# Patient Record
(demographics unavailable — no encounter records)

---

## 2024-10-16 NOTE — DISCUSSION/SUMMARY
[FreeTextEntry1] : Bronchitis- Antibiotics as prescribed. Drink plenty of fluids. Discussed use/ avoidance of OTC cough meds. Reviewed reasons to seek immediate care and follow up if condition worsens or does not improve.  Return for follow up in 1 week.   Gave HO rama Verduzco on pathophysiology and therapy of keratosis Discussed etiology with patient/parent and chronic nature of condition as well as heredity Can try lachydrin and Buff Puff but often treatment not completely satisfactory Observation only unless patient bothered Recheck PE/PRN

## 2024-10-16 NOTE — PHYSICAL EXAM
[Wheezing] : no wheezing [Rhonchi] : rhonchi [NL] : warm, clear [FreeTextEntry7] : b/l rhonchi, no distress

## 2024-10-16 NOTE — HISTORY OF PRESENT ILLNESS
[de-identified] : cough x 1 week [FreeTextEntry6] : c/o cough x 1 week, getting worse, sent home from school today, no fever eating ok  also c/o rash on face and arms

## 2024-11-26 NOTE — HISTORY OF PRESENT ILLNESS
[de-identified] : COUGH,CONGESTED [FreeTextEntry6] : cough, congested on/off for last 3-4 weeks. no fevers. eating/dirnking well

## 2024-11-26 NOTE — DISCUSSION/SUMMARY
[FreeTextEntry1] : AOM Complete antibiotic course. Provide ibuprofen as needed for pain or fever. If no improvement within 48 hours return for re-evaluation. Follow up in 2 weeks.  Albuterol Neb therapy in office:2.5mg After neb:upon repeat exmaination, significant air entry improvement Discussed triggers/using albuterol as rescue medicine For flare:  albuterol 2-3x per day for 5 days Call if no better 2-3 days, sooner for change/worsening/concerns. recheck in office prn Discussed signs/symptoms that would require immediate care.  father expressed understanding.

## 2024-12-10 NOTE — HISTORY OF PRESENT ILLNESS
[de-identified] : fever x 3 days, cough [FreeTextEntry6] : c/o fever x 3 days, low grade , reduced with tylenol / motrin cough , runny nose  drinking ok, normal UOP   had OM 2 weeks ago , symptoms had  resolved after treatment  had bronchitis 3 weeks ago, symptoms resolved after treatment

## 2024-12-10 NOTE — DISCUSSION/SUMMARY
[FreeTextEntry1] : URI - Symptomatic therapy as needed including acetaminophen or ibuprofen for fever. Increase fluids Avoid airway irritants Discussed use/avoidance of cold symptom medications Call if no better 3-5 days, sooner for change/concerns/wheeze/distress recheck prn RVP sent - due to frequent illness, phone f/u with results   Fever in office - mom prefers to give antipyretic at home . fever management discussed   At risk for dehydration  Reviewed importance of encouraging fluids, monitor UOP if not tolerating fluids / decreased UOP [place] [facility] [place]  Improved.

## 2024-12-18 NOTE — DISCUSSION/SUMMARY
[FreeTextEntry1] : Spent 30 minutes in room- reviewed full history Nebulizer given- increased aeration after treatment, crackles RLL and wheezing remain Continue nebulizer q6 Viral swab sent meds ordered Motrin q6 Increase fluids r/c 5 days

## 2024-12-18 NOTE — REVIEW OF SYSTEMS
[Fever] : fever [Ear Pain] : ear pain [Nasal Discharge] : nasal discharge [Nasal Congestion] : nasal congestion [Wheezing] : wheezing [Cough] : cough [Negative] : Genitourinary

## 2024-12-18 NOTE — PHYSICAL EXAM
[Erythema] : erythema [Clear Effusion] : clear effusion [Clear Rhinorrhea] : clear rhinorrhea [Wheezing] : wheezing [Crackles] : crackles [Rhonchi] : rhonchi [NL] : warm, clear [FreeTextEntry7] : Crackle RLL and insp and exp wheezing no distress O2 sat 97%

## 2024-12-18 NOTE — HISTORY OF PRESENT ILLNESS
[de-identified] : cough, right ear pain [FreeTextEntry6] : just getting over uri Now 102 and bad cough

## 2024-12-24 NOTE — DISCUSSION/SUMMARY
[FreeTextEntry1] : change abx- bilateral AOM will follow up in 2 days- if still fevers to go to the ER wean off albuterol as discussed increase fluids. tylenol/ motrin as needed

## 2024-12-24 NOTE — HISTORY OF PRESENT ILLNESS
[de-identified] : persistent fever [FreeTextEntry6] : fever x6-7 days  bilateral AOM and PNA on amox day 6 as per father patient is well appearing, playful. no c/o cough has improved. denies urinary symptoms.  good PO intake, UOP and BMs fever yesterday 102 and today 100.5. no medication given. in office patient has 98.9 F

## 2025-03-04 NOTE — DISCUSSION/SUMMARY
[FreeTextEntry1] : URI - Symptomatic therapy as needed including acetaminophen or ibuprofen for fever. Increase fluids Avoid airway irritants Discussed use/avoidance of cold symptom medications Call if no better 3-5 days, sooner for change/concerns/wheeze/distress recheck prn RVp sent

## 2025-03-04 NOTE — HISTORY OF PRESENT ILLNESS
[de-identified] : high fever since sunday , vomit during his sleep [FreeTextEntry6] : c/o high fever yesterday, vomited once, runny nose attends school

## 2025-03-06 NOTE — HISTORY OF PRESENT ILLNESS
[de-identified] : WOKE WITH BAD COUGH IN MIDDLE OF THE LAST NIGHT, STILL HE HAS FEVER [FreeTextEntry6] : Cough, fever, nasal congestion x1 day.  Tolerating po intake. Normal UOP.  Seen on 3/4/25 - RVP Negative  Hx - 12/29/24 - Allergic Reaction to Amoxi/Clav

## 2025-03-06 NOTE — DISCUSSION/SUMMARY
[FreeTextEntry1] : Rapid Strep: Negative Throat Culture sent to lab Treat symptoms with acetaminophen or ibuprofen as needed, encourage po fluids Discussed likely viral illness and expected course Call if no better in 3 days, sooner for change/concerns/worsening recheck prn Phone follow -up after throat culture back Will call if fever last >5 days, worsening or new symptoms  Discussed signs/symptoms that would require immediate care. Parent expressed understanding.  Symptomatic therapy as needed including acetaminophen or ibuprofen for fever/pain. Increase fluids Cool Mist Humidifier Avoid airway irritants Discussed use/avoidance of cold symptom medications  Call if no better 3-5 days, sooner for change/concerns/wheeze/distress recheck prn

## 2025-03-10 NOTE — DISCUSSION/SUMMARY
[FreeTextEntry1] : Bilateral AOM - Complete 10 days of antibiotic. Provide ibuprofen as needed for pain or fever. If no improvement within 48 hours return for re-evaluation. Discussed signs and symptoms that would required immediate care. Mother expressed understanding. All questions answered. Caretaker understands and agrees with plan. If (+) new or worsening symptoms or (+) parental concern - return to office

## 2025-03-10 NOTE — HISTORY OF PRESENT ILLNESS
[de-identified] : positive for flu b - still running fever [FreeTextEntry6] : As per mother has had a fever for 8 days consistently.  Tested positive for Flu B on 3/6/25  Tolerating fluids. Normal UOP